# Patient Record
Sex: MALE
[De-identification: names, ages, dates, MRNs, and addresses within clinical notes are randomized per-mention and may not be internally consistent; named-entity substitution may affect disease eponyms.]

---
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
## 2022-08-27 ENCOUNTER — HOSPITAL ENCOUNTER (EMERGENCY)
Facility: OTHER | Age: 22
Discharge: HOME | End: 2022-08-27
Attending: EMERGENCY MEDICINE
Payer: COMMERCIAL

## 2022-08-27 VITALS
HEIGHT: 66 IN | BODY MASS INDEX: 20.89 KG/M2 | SYSTOLIC BLOOD PRESSURE: 111 MMHG | TEMPERATURE: 98 F | WEIGHT: 130 LBS | DIASTOLIC BLOOD PRESSURE: 61 MMHG | RESPIRATION RATE: 18 BRPM | OXYGEN SATURATION: 97 % | HEART RATE: 74 BPM

## 2022-08-27 DIAGNOSIS — L08.0 PUSTULAR RASH: Primary | ICD-10-CM

## 2022-08-27 LAB
HCV AB SERPL QL IA: NEGATIVE
HIV 1+2 AB+HIV1 P24 AG SERPL QL IA: NEGATIVE

## 2022-08-27 PROCEDURE — 99284 EMERGENCY DEPT VISIT MOD MDM: CPT

## 2022-08-27 PROCEDURE — 87389 HIV-1 AG W/HIV-1&-2 AB AG IA: CPT | Performed by: EMERGENCY MEDICINE

## 2022-08-27 PROCEDURE — 86803 HEPATITIS C AB TEST: CPT | Performed by: EMERGENCY MEDICINE

## 2022-08-27 PROCEDURE — 87593 ORTHOPOXVIRUS AMP PRB EACH: CPT | Performed by: NURSE PRACTITIONER

## 2022-08-27 PROCEDURE — 86592 SYPHILIS TEST NON-TREP QUAL: CPT | Performed by: EMERGENCY MEDICINE

## 2022-08-27 PROCEDURE — 36415 COLL VENOUS BLD VENIPUNCTURE: CPT | Performed by: EMERGENCY MEDICINE

## 2022-08-27 RX ORDER — SULFAMETHOXAZOLE AND TRIMETHOPRIM 800; 160 MG/1; MG/1
1 TABLET ORAL EVERY 12 HOURS
Qty: 14 TABLET | Refills: 0 | Status: SHIPPED | OUTPATIENT
Start: 2022-08-27 | End: 2022-09-03

## 2022-08-27 RX ORDER — MUPIROCIN 20 MG/G
OINTMENT TOPICAL 3 TIMES DAILY
Qty: 15 G | Refills: 0 | Status: SHIPPED | OUTPATIENT
Start: 2022-08-27

## 2022-08-27 NOTE — ED PROVIDER NOTES
Encounter Date: 8/27/2022       History     Chief Complaint   Patient presents with    Rash to Hands     Pt presents to the ER with complaints of a red, itching, painful, pustular rash to the fingers and dorsal aspect of both hands. Pt states rash and itching started about 2-3 weeks ago, but pustules started within the past couple of days. Rash localized to hand.       Chief complaint: Hand rash    21-year-old male presents for evaluation of rash to bilateral hands.  Patient reports that he has chronically dry, itchy hands.  Reports that his hands have been more dry and itchy over last several weeks, with intermittent dry patches.  Reports pustular lesions began within the last week.  Denies fever, body aches, or chills.  Denies acute URI symptoms.  Reports he is sexually active with females.  No prior history of HIV or syphilis.  No oral lesions.  Rash is not present elsewhere, only present on his hands.    This is the extent of patient's complaints for this ER encounter.     The history is provided by the patient.   Review of patient's allergies indicates:  No Known Allergies  No past medical history on file.  No past surgical history on file.  No family history on file.     Review of Systems   Constitutional:  Negative for fever.   HENT:  Negative for congestion, rhinorrhea and sore throat.    Respiratory:  Negative for cough and shortness of breath.    Cardiovascular:  Negative for chest pain.   Gastrointestinal:  Negative for abdominal pain.   Genitourinary:  Negative for difficulty urinating.   Musculoskeletal:  Negative for arthralgias, back pain, myalgias and neck pain.   Skin:  Positive for color change, rash and wound (pustular lesions to bilateral hands).   Neurological:  Negative for weakness and headaches.   Psychiatric/Behavioral: Negative.       Physical Exam     Initial Vitals [08/27/22 1334]   BP Pulse Resp Temp SpO2   125/79 100 18 98.4 °F (36.9 °C) (!) 94 %      MAP       --         Physical  Exam    Nursing note and vitals reviewed.  Constitutional: No distress.   HENT:   Head: Normocephalic and atraumatic.   Nose: Nose normal.   Mouth/Throat: Oropharynx is clear and moist and mucous membranes are normal.   Eyes: Conjunctivae, EOM and lids are normal. Right pupil is round. Left pupil is round. Pupils are equal.   Neck: Neck supple.   Cardiovascular:  Normal rate, regular rhythm and normal heart sounds.           Pulmonary/Chest: Effort normal and breath sounds normal. No respiratory distress.   Musculoskeletal:         General: Normal range of motion.      Cervical back: Neck supple.     Neurological: He is alert and oriented to person, place, and time. He has normal strength.   Skin: Skin is warm and dry. Lesion and rash noted. Rash is pustular. There is erythema.   Psychiatric: He has a normal mood and affect. His behavior is normal.                     ED Course   Procedures  Labs Reviewed   HIV 1 / 2 ANTIBODY    Narrative:     Release to patient->Immediate   HEPATITIS C ANTIBODY    Narrative:     Release to patient->Immediate   RPR   MONKEYPOX (ORTHOPOXVIRUS) PCR   RPR          Imaging Results    None          Medications - No data to display  Medical Decision Making:   Initial Assessment:   21-year-old male presents for evaluation of pustular rash noted to bilateral hands over the past week.  Clinical Tests:   Lab Tests: Ordered  ED Management:  Will test for HIV, syphilis, and monkeypox.  Will treat pustular rash with Bactroban ointment and Bactrim antibiotics by mouth.  Educated on home quarantine while monkeypox testing is pending.    Patient/caregiver voices understanding and feels comfortable with discharge plan.      The patient acknowledges that close follow up with medical provider is required. Instructed to follow up with PCP within 2 days. Patient was given specific return precautions. The patient agrees to comply with all instruction and directions given in the ER.                        Clinical Impression:   Final diagnoses:  [L08.0] Pustular rash (Primary)      ED Disposition Condition    Discharge Stable          ED Prescriptions       Medication Sig Dispense Start Date End Date Auth. Provider    mupirocin (BACTROBAN) 2 % ointment Apply topically 3 (three) times daily. 15 g 8/27/2022 -- Aleyda Duarte NP    sulfamethoxazole-trimethoprim 800-160mg (BACTRIM DS) 800-160 mg Tab Take 1 tablet by mouth every 12 (twelve) hours. for 7 days 14 tablet 8/27/2022 9/3/2022 Aleyda Duarte NP          Follow-up Information       Follow up With Specialties Details Why Contact Info    Primary care  Schedule an appointment as soon as possible for a visit in 2 days               Aleyda Duarte NP  08/27/22 4968

## 2022-08-27 NOTE — ED TRIAGE NOTES
"21 YOM presents to ED with c/o rash to bilateral hands X 4 wks. Stated in the last 3-4 days now drainage to bilateral hands. Rash, swelling, clear drainage noted to bilateral hands. Also c/o pain to bilateral under arms x 4 days. Redness noted to bilateral under arms. Stated "the redness may be from my deodorant." A&Ox4. Denies any other compliants.     LOC: The patient is awake, alert and aware of environment with an appropriate affect, the patient is oriented x 3.  APPEARANCE: Patient resting comfortably and in no acute distress, patient is clean and well groomed, patient's clothing is properly fastened.  SKIN: The skin is warm and dry, patient has normal skin turgor and moist mucus membranes, skin intact, no breakdown or brusing noted.  MUSKULOSKELETAL: Patient moving all extremities well, no obvious swelling or deformities noted.  RESPIRATORY: Airway is open and patent, respirations are spontaneous, patient has a normal effort and rate.  CARDIAC: No peripheral edema.  ABDOMEN: Soft and + tenderness to palpation, no distention noted.     ED workup in progress. VSS. Safety measures in place; side rails up x2. Call light within pt reach. Will continue to monitor.    "

## 2022-08-27 NOTE — DISCHARGE INSTRUCTIONS
**Follow up with PCP in 24-48 hours. Return to ER with worsening of symptoms.     **Over the counter medications as needed as directed on package insert. Drink plenty fluids. Get plenty rest. Wash hands frequently.     Take antibiotics as prescribed until course completion.  Monitor for worsening signs of infection.    Testing for monthly pox is pending.  You should home quarantine until test results her back.  If positive, you will be contacted with further instructions.  Test results may take 1-2 weeks.

## 2022-08-28 LAB — RPR SER QL: NORMAL

## 2022-09-01 LAB — NONVAR ORTHPX DNA SPEC QL NAA+PROBE: NORMAL
